# Patient Record
Sex: MALE | Race: WHITE | NOT HISPANIC OR LATINO | Employment: FULL TIME | URBAN - METROPOLITAN AREA
[De-identification: names, ages, dates, MRNs, and addresses within clinical notes are randomized per-mention and may not be internally consistent; named-entity substitution may affect disease eponyms.]

---

## 2022-01-20 ENCOUNTER — OFFICE VISIT (OUTPATIENT)
Dept: UROLOGY | Facility: CLINIC | Age: 42
End: 2022-01-20
Payer: COMMERCIAL

## 2022-01-20 VITALS
BODY MASS INDEX: 29.68 KG/M2 | WEIGHT: 231.25 LBS | TEMPERATURE: 98 F | DIASTOLIC BLOOD PRESSURE: 84 MMHG | HEART RATE: 102 BPM | HEIGHT: 74 IN | SYSTOLIC BLOOD PRESSURE: 125 MMHG

## 2022-01-20 DIAGNOSIS — N52.9 ERECTILE DYSFUNCTION, UNSPECIFIED ERECTILE DYSFUNCTION TYPE: Primary | ICD-10-CM

## 2022-01-20 PROCEDURE — 1160F RVW MEDS BY RX/DR IN RCRD: CPT | Mod: CPTII,S$GLB,, | Performed by: UROLOGY

## 2022-01-20 PROCEDURE — 99204 OFFICE O/P NEW MOD 45 MIN: CPT | Mod: S$GLB,,, | Performed by: UROLOGY

## 2022-01-20 PROCEDURE — 3008F BODY MASS INDEX DOCD: CPT | Mod: CPTII,S$GLB,, | Performed by: UROLOGY

## 2022-01-20 PROCEDURE — 3079F DIAST BP 80-89 MM HG: CPT | Mod: CPTII,S$GLB,, | Performed by: UROLOGY

## 2022-01-20 PROCEDURE — 99204 PR OFFICE/OUTPT VISIT, NEW, LEVL IV, 45-59 MIN: ICD-10-PCS | Mod: S$GLB,,, | Performed by: UROLOGY

## 2022-01-20 PROCEDURE — 3074F SYST BP LT 130 MM HG: CPT | Mod: CPTII,S$GLB,, | Performed by: UROLOGY

## 2022-01-20 PROCEDURE — 3079F PR MOST RECENT DIASTOLIC BLOOD PRESSURE 80-89 MM HG: ICD-10-PCS | Mod: CPTII,S$GLB,, | Performed by: UROLOGY

## 2022-01-20 PROCEDURE — 1160F PR REVIEW ALL MEDS BY PRESCRIBER/CLIN PHARMACIST DOCUMENTED: ICD-10-PCS | Mod: CPTII,S$GLB,, | Performed by: UROLOGY

## 2022-01-20 PROCEDURE — 3008F PR BODY MASS INDEX (BMI) DOCUMENTED: ICD-10-PCS | Mod: CPTII,S$GLB,, | Performed by: UROLOGY

## 2022-01-20 PROCEDURE — 1159F MED LIST DOCD IN RCRD: CPT | Mod: CPTII,S$GLB,, | Performed by: UROLOGY

## 2022-01-20 PROCEDURE — 3074F PR MOST RECENT SYSTOLIC BLOOD PRESSURE < 130 MM HG: ICD-10-PCS | Mod: CPTII,S$GLB,, | Performed by: UROLOGY

## 2022-01-20 PROCEDURE — 99999 PR PBB SHADOW E&M-NEW PATIENT-LVL III: CPT | Mod: PBBFAC,,, | Performed by: UROLOGY

## 2022-01-20 PROCEDURE — 99999 PR PBB SHADOW E&M-NEW PATIENT-LVL III: ICD-10-PCS | Mod: PBBFAC,,, | Performed by: UROLOGY

## 2022-01-20 PROCEDURE — 1159F PR MEDICATION LIST DOCUMENTED IN MEDICAL RECORD: ICD-10-PCS | Mod: CPTII,S$GLB,, | Performed by: UROLOGY

## 2022-01-20 RX ORDER — TADALAFIL 5 MG/1
5 TABLET ORAL DAILY PRN
Qty: 10 TABLET | Refills: 2 | Status: SHIPPED | OUTPATIENT
Start: 2022-01-20 | End: 2023-01-20

## 2022-01-20 RX ORDER — MELOXICAM 7.5 MG/1
7.5 TABLET ORAL 2 TIMES DAILY
COMMUNITY
Start: 2021-11-28

## 2022-01-20 RX ORDER — SILDENAFIL 25 MG/1
25 TABLET, FILM COATED ORAL DAILY PRN
Qty: 10 TABLET | Refills: 2 | Status: SHIPPED | OUTPATIENT
Start: 2022-01-20 | End: 2023-01-20

## 2022-01-20 NOTE — PROGRESS NOTES
Chief Complaint:  ED    HPI:   Real Feldman III is a 41 y.o. male that presents today for evaluation of ED.  Patient notes about a year history of difficulty with achieving an erection after drinking alcohol as well as achieving an erection after orgasm.  In general, he has good a.m. erections and does achieve a satisfactory erection that lasts long enough for him to achieve orgasm.  He has never tried a medication before.  He denies any voiding issues, denies gross hematuria, denies kidney stones.  He does have a family history of colorectal cancer but denies family history of  cancers.      PMH:  Past Medical History:   Diagnosis Date    Patient denies medical problems        PSH:  Past Surgical History:   Procedure Laterality Date    VASECTOMY         Family History:  Denies  cancers    Social History:  Social History     Tobacco Use    Smoking status: Never Smoker    Smokeless tobacco: Never Used        Review of Systems:  General: No fever, chills  Skin: No rashes  Chest:  Denies cough and sputum production  Heart: Denies chest pain  Resp: Denies dyspnea  Abdomen: Denies diarrhea, abdominal pain, hematemesis, or blood in stool.  Musculoskeletal: No joint stiffness or swelling. Denies back pain.  : see HPI  Neuro: no dizziness or weakness    Allergies:  Patient has no known allergies.    Medications:    Current Outpatient Medications:     meloxicam (MOBIC) 7.5 MG tablet, Take 7.5 mg by mouth 2 (two) times daily., Disp: , Rfl:     sildenafiL (VIAGRA) 25 MG tablet, Take 1 tablet (25 mg total) by mouth daily as needed for Erectile Dysfunction., Disp: 10 tablet, Rfl: 2    tadalafiL (CIALIS) 5 MG tablet, Take 1 tablet (5 mg total) by mouth daily as needed for Erectile Dysfunction., Disp: 10 tablet, Rfl: 2    Physical Exam:  Vitals:    01/20/22 1304   BP: 125/84   Pulse: 102   Temp: 98.1 °F (36.7 °C)     Body mass index is 29.69 kg/m².  General: awake, alert, cooperative  Head: NC/AT  Ears:  external ears normal  Eyes: sclera normal  Lungs: normal inspiration, NAD  Heart: well-perfused  Abdomen: Soft, NT, ND  : Normal circ'd phallus, meatus normal in size and position, BL testicles palpable, no masses, nontender, no abnormalities of epididymi  Lymphatic: groin nodes negative  Skin: The skin is warm and dry  Ext: No c/c/e.  Neuro: grossly intact, AOx3    RADIOLOGY:  No recent relevant imaging available for review.    LABS:  I personally reviewed the following lab values:  No results found for: WBC, HGB, HCT, PLT, NA, K, CL, CREATININE, BUN, CO2, TSH, PSA, INR, GLUF, HGBA1C, MICROALBUR, CHOL, TRIG, HDL, LDLDIRECT, ALT, AST      Assessment/Plan:   Real Feldman III is a 41 y.o. male with ED. I reviewed the etiology and management of ED.  Management options include oral medications, vacuum erectile devices, MUSE urethral suppositories, intracavernosal injections, and surgical placement of a penile prosthesis.  I reviewed the risks and benefits of each.  For medications, I noted that they are safe and effective, but noted that side effects include flushing of the face, headaches, color vision change, blurry vision, and congestion/runny nose. They should not be used by anyone currently taking nitrates for chest pain, and I reviewed the patient's current medications in the chart and verbally with the patient and he is not. For vacuum erectile devices, I noted that they also are safe but that they require the use of a restrictive ring at the base of the penis in order to prevent a detumescence, which can be uncomfortable for some patients. They also have the added benefits of being able to be combined with medical therapy for added effect.  For MUSE, I noted that these are reasonably effective, but that patients usually experience urethral burning, which can also be experienced by the partner, often requiring the use of condoms.  For intracavernosal injections I explained that this is usually the most  efficacious medication to achieve a natural erection, but requires injecting the penis, which is not a suitable option for everybody.  For surgical options I reviewed a that there is the surgical risks which include pain, bleeding, and infection.  I noted that an infection of the device would require it to be removed, which can make replacement at a later date very difficult.  I explained that once a prosthesis is implanted the patient will never achieve a natural erection ever again.  Patient would like to try both viagra and cialis to see which one works best for him. Rx for both sent to pharmacy and instructed patient on obtaining special pricing with [employer]. I gave strict instructions to not take both medications within 24 hours of the other. He will let us know which one he prefers. F/u 1 yr.    Thank you for allowing me the opportunity to participate in this patient's care.     Enrique Suárez MD  Urology